# Patient Record
Sex: MALE | Race: WHITE | NOT HISPANIC OR LATINO | Employment: FULL TIME | URBAN - METROPOLITAN AREA
[De-identification: names, ages, dates, MRNs, and addresses within clinical notes are randomized per-mention and may not be internally consistent; named-entity substitution may affect disease eponyms.]

---

## 2022-01-28 ENCOUNTER — OFFICE VISIT (OUTPATIENT)
Dept: MEDICAL GROUP | Facility: LAB | Age: 37
End: 2022-01-28
Payer: COMMERCIAL

## 2022-01-28 VITALS
SYSTOLIC BLOOD PRESSURE: 110 MMHG | DIASTOLIC BLOOD PRESSURE: 68 MMHG | BODY MASS INDEX: 22.33 KG/M2 | RESPIRATION RATE: 15 BRPM | WEIGHT: 156 LBS | HEART RATE: 90 BPM | HEIGHT: 70 IN | OXYGEN SATURATION: 97 % | TEMPERATURE: 97.4 F

## 2022-01-28 DIAGNOSIS — Z01.84 IMMUNITY STATUS TESTING: ICD-10-CM

## 2022-01-28 DIAGNOSIS — L30.1 DYSHIDROTIC ECZEMA: ICD-10-CM

## 2022-01-28 DIAGNOSIS — Z11.3 ROUTINE SCREENING FOR STI (SEXUALLY TRANSMITTED INFECTION): ICD-10-CM

## 2022-01-28 DIAGNOSIS — Z76.89 ENCOUNTER TO ESTABLISH CARE: ICD-10-CM

## 2022-01-28 DIAGNOSIS — Z13.6 SCREENING FOR CARDIOVASCULAR CONDITION: ICD-10-CM

## 2022-01-28 PROCEDURE — 99203 OFFICE O/P NEW LOW 30 MIN: CPT | Mod: CS | Performed by: FAMILY MEDICINE

## 2022-01-28 RX ORDER — TRIAMCINOLONE ACETONIDE 1 MG/G
CREAM TOPICAL
Qty: 80 G | Refills: 3 | Status: SHIPPED | OUTPATIENT
Start: 2022-01-28

## 2022-01-28 ASSESSMENT — ENCOUNTER SYMPTOMS
ABDOMINAL PAIN: 0
NAUSEA: 0
SHORTNESS OF BREATH: 0
VOMITING: 0
DEPRESSION: 0
FEVER: 0
BLURRED VISION: 0
PALPITATIONS: 0
DIARRHEA: 0
CHILLS: 0
WHEEZING: 0
NERVOUS/ANXIOUS: 0

## 2022-01-28 NOTE — PROGRESS NOTES
Az Robbins is a 36 y.o. male here for   Chief Complaint   Patient presents with   • [facility]     New to [place] from [place]   • Annual Exam   • Requesting Labs       HPI:  Az is a very pleasant 36 y.o. male.     #Establish care   -Reviewed all past medical history, family history, social history.  -Reviewed all screening/vaccinations: Due for Tdap, influenza, COVID-19 vaccine.  Declines influenza, COVID-19 vaccine at this time.  -Diet and Exercise: appropriate for age. Eating healthily, exercise daily.   -Tobacco, alcohol, recreational drug use: Former smoker, discontinued smoking approximately 5 years ago.  Occasional alcohol use on the weekends.  Denies any recreational illicit drug use.  -Sexually active: Yes, currently not active; however, was sexually active with different partner this last year.  Requesting STD screening.    #Skin rash   -dry, itchy,scaly skin that is occurring on the back since moving from [facility].  He states that it is a red rash that he feels resembles ringworm.  He has been treating with topical antifungals with no improvement.  He states it is worse in areas where skin is rubbing against something such as beltline.  He has noticed his skin becoming more dry since he moved from [facility].    #COVID-19 exposure:  -Patient was exposed to COVID-19 around [place].  Around that time he also developed sore throat, cough, aches.  Patient has not vaccine for COVID-19.  No test was completed at that time.  Requesting antibody test to see if he was exposed.      Current medicines (including changes today)  Current Outpatient Medications   Medication Sig Dispense Refill   • Multiple Vitamin (MULTI VITAMIN MENS PO) Take  by mouth.       No current facility-administered medications for this visit.     He  has no past medical history on file.  He  has no past surgical history on file.  Social History     Tobacco Use   • Smoking status: Former Smoker     Start date: 2005      "Quit date:      Years since quittin.0   • Smokeless tobacco: Never Used   Vaping Use   • Vaping Use: Former   • Substances: Nicotine, Flavoring   • Devices: Pre-filled or refillable cartridge, Refillable tank   Substance Use Topics   • Alcohol use: Yes     Comment: SOCIAL    • Drug use: Never     Social History     Social History Narrative   • Not on file     No family history on file.  No family status information on file.     ROS  Review of Systems   Constitutional: Negative for chills and fever.   HENT: Negative for hearing loss.    Eyes: Negative for blurred vision.   Respiratory: Negative for shortness of breath and wheezing.    Cardiovascular: Negative for chest pain and palpitations.   Gastrointestinal: Negative for abdominal pain, diarrhea, nausea and vomiting.   Psychiatric/Behavioral: Negative for depression. The patient is not nervous/anxious.       Objective:     /68   Pulse 90   Temp 36.3 °C (97.4 °F) (Temporal)   Resp 15   Ht 1.765 m (5' 9.5\")   Wt 70.8 kg (156 lb)   SpO2 97%  Body mass index is 22.71 kg/m².  Physical Exam:    Constitutional: Alert, no distress.  Skin: Warm, dry, good turgor, no rashes in visible areas.  Eye: Equal, round and reactive, conjunctiva clear, lids normal.  ENMT: Lips without lesions, good dentition, oropharynx clear. TM's pearly gray with normal light reflexes bilaterally  Neck: Trachea midline, no masses, no thyromegaly. No cervical or supraclavicular lymphadenopathy.  Respiratory: Unlabored respiratory effort, lungs clear to auscultation bilaterally, no wheezes, rales, or ronchi.  Cardiovascular: Normal S1, S2, RRR, no murmur, no edema.  Abdomen: Soft, non-tender, no masses, no hepatosplenomegaly.  Psych: Alert and oriented x3, normal affect and mood.      Assessment and Plan:   The following treatment plan was discussed    1. Encounter to establish care  --All questions concerns were answered at this time.  -Vaccinations/screenings needed at this " time: We will complete STD screening at this time.  We will also obtain previous vaccination record from [place].  -Labs reviewed, NA, will check labs as below.  -Discussed the importance of a healthy, Mediterranean style diet, routine exercise regimen.  -Discussed general safety measures including seatbelts, helmets, avoidance of smoking, sunscreen, hydration.  -Follow-up for general physical exam on a yearly basis, sooner if needed.  - CBC WITHOUT DIFFERENTIAL; Future  - Comp Metabolic Panel; Future    2. Dyshidrotic eczema  -Signs and symptoms consistent with dyshidrotic eczema.  Discussed proper skin care including use of emollient creams daily, avoid excessive exposure to water.  We will treat acutely with triamcinolone cream.  Patient will follow-up as needed.  - triamcinolone acetonide (KENALOG) 0.1 % Cream; Apply to the affected area on back twice a day as needed for rash  Dispense: 80 g; Refill: 3    3. Immunity status testing  - SARS CoV-2 Ab, Total; Future    4. Routine screening for STI (sexually transmitted infection)  - Chlamydia/GC PCR Urine Or Swab; Future  - HIV AG/AB COMBO ASSAY SCREENING; Future  - RPR    5. Screening for cardiovascular condition  - Lipid Profile; Future      Records requested.  Followup: No follow-ups on file.         This note was created using voice recognition software. I have made every reasonable attempt to correct errors, however, I do anticipate some grammatical errors.

## 2022-11-12 ENCOUNTER — HOSPITAL ENCOUNTER (EMERGENCY)
Facility: MEDICAL CENTER | Age: 37
End: 2022-11-12
Attending: EMERGENCY MEDICINE
Payer: COMMERCIAL

## 2022-11-12 ENCOUNTER — APPOINTMENT (OUTPATIENT)
Dept: RADIOLOGY | Facility: MEDICAL CENTER | Age: 37
End: 2022-11-12
Attending: EMERGENCY MEDICINE
Payer: COMMERCIAL

## 2022-11-12 VITALS
TEMPERATURE: 97.5 F | BODY MASS INDEX: 23.51 KG/M2 | SYSTOLIC BLOOD PRESSURE: 124 MMHG | DIASTOLIC BLOOD PRESSURE: 74 MMHG | WEIGHT: 158.73 LBS | HEIGHT: 69 IN | RESPIRATION RATE: 18 BRPM | HEART RATE: 77 BPM | OXYGEN SATURATION: 94 %

## 2022-11-12 DIAGNOSIS — S42.201A CLOSED FRACTURE OF PROXIMAL END OF RIGHT HUMERUS, UNSPECIFIED FRACTURE MORPHOLOGY, INITIAL ENCOUNTER: ICD-10-CM

## 2022-11-12 PROCEDURE — 73030 X-RAY EXAM OF SHOULDER: CPT | Mod: RT

## 2022-11-12 PROCEDURE — 99283 EMERGENCY DEPT VISIT LOW MDM: CPT

## 2022-11-12 RX ORDER — IBUPROFEN 400 MG/1
400 TABLET ORAL EVERY 6 HOURS PRN
Qty: 30 TABLET | Refills: 0 | Status: SHIPPED | OUTPATIENT
Start: 2022-11-12

## 2022-11-12 RX ORDER — OXYCODONE HYDROCHLORIDE 5 MG/1
5 TABLET ORAL EVERY 4 HOURS PRN
Qty: 12 TABLET | Refills: 0 | Status: SHIPPED | OUTPATIENT
Start: 2022-11-12 | End: 2022-11-20

## 2022-11-12 NOTE — ED TRIAGE NOTES
"Presents complaining of right shoulder pain after incurring a skiing fall earlier this AM.  He denies neck pain, and declines the application of a C collar.   Chief Complaint   Patient presents with    Fall    Shoulder Injury     /86   Pulse 78   Temp 36.1 °C (97 °F) (Temporal)   Resp 18   Ht 1.753 m (5' 9\")   Wt 72 kg (158 lb 11.7 oz)   SpO2 97%   BMI 23.44 kg/m²   Has this patient been vaccinated for COVID NO  If not, would they like to be vaccinated while in the ER if eligible?  NO  Would the patient like to speak with the ERP about the possibility of receiving the COVID vaccine today before making a decision? NO    "

## 2022-11-12 NOTE — ED PROVIDER NOTES
ED Provider Note    CHIEF COMPLAINT  Chief Complaint   Patient presents with    Fall    Shoulder Injury       HPI  Az Robbins is a 36 y.o. male who presents with shoulder pain after ski accident.  Patient reports he fell onto his right shoulder while skiing.  He reports pain in his right shoulder.  He denies any associated neck or back pain.  Denies any elbow or wrist pain.  Denies any other injury sustained.    REVIEW OF SYSTEMS  ROS  See HPI for further details. All other systems are negative.     PAST MEDICAL HISTORY       SOCIAL HISTORY  Social History     Tobacco Use    Smoking status: Former     Types: Cigarettes     Start date:      Quit date:      Years since quittin.8    Smokeless tobacco: Never   Vaping Use    Vaping Use: Former    Substances: Nicotine, Flavoring    Devices: Pre-filled or refillable cartridge, Refillable tank   Substance and Sexual Activity    Alcohol use: Yes     Comment: Occasionally    Drug use: Never    Sexual activity: Not Currently       SURGICAL HISTORY  patient denies any surgical history    CURRENT MEDICATIONS  Home Medications    **Home medications have not yet been reviewed for this encounter**         ALLERGIES  No Known Allergies    PHYSICAL EXAM  Vitals:    22 1253   BP: 128/86   Pulse: 78   Resp: 18   Temp: 36.1 °C (97 °F)   SpO2: 97%       Physical Exam  Constitutional:       Appearance: He is well-developed.   Eyes:      Conjunctiva/sclera: Conjunctivae normal.   Pulmonary:      Effort: Pulmonary effort is normal.   Musculoskeletal:      Cervical back: Normal range of motion and neck supple.      Comments: Test palpation at the right glenohumeral joint without any obvious step-off.  No associated bony abnormality.  Elbow and wrist are clear of any tenderness palpation or pain on range of motion.  No tenderness of the clavicle.   Skin:     General: Skin is warm.   Neurological:      Mental Status: He is alert and oriented to person, place, and  time.   Psychiatric:         Behavior: Behavior normal.     DX-SHOULDER 2+ RIGHT   Final Result      1.  There is a mildly comminuted right greater tuberosity fracture.            COURSE & MEDICAL DECISION MAKING  Pertinent Labs & Imaging studies reviewed. (See chart for details)    X-ray is check for possible fracture versus shoulder separation.  X-ray reveals mildly comminuted greater tuberosity fracture.  Patient placed in sling.  Patient given ibuprofen and a very short course of oxycodone.  He will follow-up with orthopedics.   I discussed risks of narcotics with patient including addiction, dependence and overdose. He has been consented for use. He does not show any evidence of [place]. He understands to take this only for breakthrough pain.      The patient will return for worsening symptoms and is stable at the time of discharge. The patient verbalizes understanding and will comply.    FINAL IMPRESSION    1. Closed fracture of proximal end of right humerus, unspecified fracture morphology, initial encounter            Electronically signed by: Maxx Garcia M.D., 11/12/2022 1:04 PM

## 2023-03-16 ENCOUNTER — OFFICE VISIT (OUTPATIENT)
Dept: MEDICAL GROUP | Facility: LAB | Age: 38
End: 2023-03-16
Payer: COMMERCIAL

## 2023-03-16 VITALS
DIASTOLIC BLOOD PRESSURE: 70 MMHG | HEIGHT: 69 IN | TEMPERATURE: 97.4 F | BODY MASS INDEX: 24.44 KG/M2 | RESPIRATION RATE: 15 BRPM | WEIGHT: 165 LBS | SYSTOLIC BLOOD PRESSURE: 112 MMHG | HEART RATE: 95 BPM | OXYGEN SATURATION: 98 %

## 2023-03-16 DIAGNOSIS — Z13.6 SCREENING FOR CARDIOVASCULAR CONDITION: ICD-10-CM

## 2023-03-16 DIAGNOSIS — S49.91XA INJURY OF RIGHT SHOULDER, INITIAL ENCOUNTER: ICD-10-CM

## 2023-03-16 DIAGNOSIS — Z00.00 ANNUAL PHYSICAL EXAM: ICD-10-CM

## 2023-03-16 DIAGNOSIS — H69.91 DYSFUNCTION OF RIGHT EUSTACHIAN TUBE: ICD-10-CM

## 2023-03-16 PROCEDURE — 99395 PREV VISIT EST AGE 18-39: CPT | Performed by: FAMILY MEDICINE

## 2023-03-16 ASSESSMENT — ENCOUNTER SYMPTOMS
WHEEZING: 0
SHORTNESS OF BREATH: 0
NAUSEA: 0
ABDOMINAL PAIN: 0
BLURRED VISION: 0
VOMITING: 0
DIZZINESS: 0
PALPITATIONS: 0
DEPRESSION: 0
NERVOUS/ANXIOUS: 0
FEVER: 0
HEADACHES: 0
DIARRHEA: 0

## 2023-03-16 ASSESSMENT — PATIENT HEALTH QUESTIONNAIRE - PHQ9: CLINICAL INTERPRETATION OF PHQ2 SCORE: 0

## 2023-03-16 NOTE — PROGRESS NOTES
Subjective:   Az Robbins is a 37 y.o. male here today for   Chief Complaint   Patient presents with    Annual Exam    Hearing Problem     Saw ENT, Inflammation     Shoulder Injury     Follow-up, inform you of a ski injury      #Annual   -Reviewed all past medical history, family history, social history.  -Reviewed all screening/vaccinations: Due for Tdap, COVID vaccines.  Declines COVID-vaccine.  Due for labs include lipid profile.  -Diet and Exercise: For age and condition, no concerns  -Tobacco, alcohol, recreational drug use: Rare alcohol use.  Denies any tobacco, drug use.  -Sexually active: No new sexual partners, no concerns.    #Shoulder injury   -Injury of right shoulder occurred during ski accident November 2022.  At that time he also had closed fracture of the humerus which is being treated at [facility]. Feeling well, continuing with physical therapy which has been helping improve strength and mobility.     #Decreased hearing   -Being followed by ENT, diagnosed and treated for eustachion tube dysfunction.  Currently treating with Flonase.      No Known Allergies      Current medicines (including changes today)  Current Outpatient Medications   Medication Sig Dispense Refill    ibuprofen (MOTRIN) 400 MG Tab Take 1 Tablet by mouth every 6 hours as needed for Moderate Pain or Mild Pain. 30 Tablet 0    Multiple Vitamin (MULTI VITAMIN MENS PO) Take  by mouth.      triamcinolone acetonide (KENALOG) 0.1 % Cream Apply to the affected area on back twice a day as needed for rash 80 g 3     No current facility-administered medications for this visit.     He  has no past medical history on file.    ROS   Review of Systems   Constitutional:  Negative for fever.   HENT:  Positive for hearing loss.    Eyes:  Negative for blurred vision.   Respiratory:  Negative for shortness of breath and wheezing.    Cardiovascular:  Negative for chest pain and palpitations.   Gastrointestinal:  Negative for abdominal pain,  "diarrhea, nausea and vomiting.   Neurological:  Negative for dizziness and headaches.   Psychiatric/Behavioral:  Negative for depression. The patient is not nervous/anxious.       Objective:     Physical Exam:  /70   Pulse 95   Temp 36.3 °C (97.4 °F) (Temporal)   Resp 15   Ht 1.753 m (5' 9.02\")   Wt 74.8 kg (165 lb)   SpO2 98%  Body mass index is 24.35 kg/m².   Constitutional: Alert, no distress.  Skin: Warm, dry, good turgor, no rashes in visible areas.  Eye: Equal, round and reactive, conjunctiva clear, lids normal.  ENMT: TM's clear bilaterally, lips without lesions, good dentition, oropharynx clear.  Neck: Trachea midline, no masses, no thyromegaly. No cervical or supraclavicular lymphadenopathy.  Respiratory: Unlabored respiratory effort, lungs clear to auscultation, no wheezes, no rhonchi.  Cardiovascular: Normal S1, S2, no murmur, no edema.  Abdomen: Soft, non-tender, no masses, no hepatosplenomegaly.  Psych: Alert and oriented x3, normal affect and mood.    Assessment and Plan:     1. Annual physical exam  -All questions concerns were answered at this time.  -Vaccinations/screenings needed at this time: We will follow-up at a different appointment for Tdap.  -Labs reviewed, concerns, check labs as below.  -Discussed the importance of a healthy, Mediterranean style diet, routine exercise regimen.  -Discussed general safety measures including seatbelts, helmets, avoidance of smoking, sunscreen, hydration.  -Follow-up for general physical exam on a yearly basis, sooner if needed.  - CBC WITHOUT DIFFERENTIAL; Future  - Comp Metabolic Panel; Future    2. Dysfunction of right eustachian tube  -Continue with Flonase, follow-up with ENT in 2 weeks.    3. Injury of right shoulder, initial encounter  -Continue with [facility] therapy under the direction of orthopedics, follow-up with Ortho if needed.    4. Screening for cardiovascular condition  - Lipid Profile; Future    Followup: No follow-ups on file.     "     PLEASE NOTE: This dictation was created using voice recognition software. I have made every reasonable attempt to correct obvious errors, but I expect that there are errors of grammar and possibly content that I did not discover before finalizing the note.

## 2023-09-14 ENCOUNTER — TELEMEDICINE (OUTPATIENT)
Dept: MEDICAL GROUP | Facility: LAB | Age: 38
End: 2023-09-14
Payer: COMMERCIAL

## 2023-09-14 VITALS — BODY MASS INDEX: 24.44 KG/M2 | HEIGHT: 69 IN | WEIGHT: 165 LBS

## 2023-09-14 DIAGNOSIS — N52.1 ERECTILE DYSFUNCTION DUE TO DISEASES CLASSIFIED ELSEWHERE: ICD-10-CM

## 2023-09-14 PROCEDURE — 99213 OFFICE O/P EST LOW 20 MIN: CPT | Performed by: FAMILY MEDICINE

## 2023-09-14 RX ORDER — INFLUENZA A VIRUS A/BRISBANE/02/2018 IVR-190 (H1N1) ANTIGEN (FORMALDEHYDE INACTIVATED), INFLUENZA A VIRUS A/KANSAS/14/2017 X-327 (H3N2) ANTIGEN (FORMALDEHYDE INACTIVATED), INFLUENZA B VIRUS B/PHUKET/3073/2013 ANTIGEN (FORMALDEHYDE INACTIVATED), AND INFLUENZA B VIRUS B/MARYLAND/15/2016 BX-69A ANTIGEN (FORMALDEHYDE INACTIVATED) 7.5; 7.5; 7.5; 7.5 UG/.25ML; UG/.25ML; UG/.25ML; UG/.25ML
INJECTION, SUSPENSION INTRAMUSCULAR
COMMUNITY
End: 2023-09-14

## 2023-09-14 RX ORDER — TADALAFIL 10 MG/1
10 TABLET ORAL
Qty: 10 TABLET | Refills: 3 | Status: SHIPPED | OUTPATIENT
Start: 2023-09-14

## 2023-09-14 RX ORDER — CYCLOBENZAPRINE HCL 10 MG
TABLET ORAL
COMMUNITY
End: 2024-03-10

## 2023-09-14 RX ORDER — CLONAZEPAM 0.5 MG/1
1 TABLET ORAL
COMMUNITY
End: 2024-03-10

## 2023-09-15 NOTE — PROGRESS NOTES
"Virtual Visit: Established Patient   This visit was conducted via Zoom using secure and encrypted videoconferencing technology.   The patient was in their home in the state of [place].    The patient's identity was confirmed and verbal consent was obtained for this virtual visit.     Subjective:   CC:   Chief Complaint   Patient presents with    New Med Request     X toñito issue for medication        Az Robbins is a 37 y.o. male presenting for evaluation and management of:    #ED   -Patient here to discuss erectile dysfunction.  He states he is having difficulty both achieving and maintaining erection.  At first, it was thought to be due to a lot of stress at work; however, has become much more of a problem with new sexual partner.  Patient denies any previous history of EGD.  Denies any vascular pathology or history.  Denies any increased fatigue, lower extremity swelling, difficulty breathing.  He denies any dysuria, hematuria, urethral discharge.  Here to discuss potential treatment.    ROS   -See HPI    Current medicines (including changes today)  Current Outpatient Medications   Medication Sig Dispense Refill    clonazePAM (KLONOPIN) 0.5 MG Tab Take 1 Tablet by mouth 1 time a day as needed.      cyclobenzaprine (FLEXERIL) 10 mg Tab TAKE 1 TABLET BY MOUTH TWICE DAILY AS NEEDED FOR 10 DAYS      Influenza PEDS Vaccine Quad pf (FLUZONE QUADRIVALENT) 0.25 ML Suspension Prefilled Syringe injection ADM 0.5ML IM UTD      ibuprofen (MOTRIN) 400 MG Tab Take 1 Tablet by mouth every 6 hours as needed for Moderate Pain or Mild Pain. 30 Tablet 0    Multiple Vitamin (MULTI VITAMIN MENS PO) Take  by mouth.      triamcinolone acetonide (KENALOG) 0.1 % Cream Apply to the affected area on back twice a day as needed for rash 80 g 3     No current facility-administered medications for this visit.       There are no problems to display for this patient.       Objective:   Ht 1.753 m (5' 9.02\")   Wt 74.8 kg (165 lb)   BMI " 24.35 kg/m²     Physical Exam:  Constitutional: Alert, no distress, well-groomed.  Skin: No rashes in visible areas.  Eye: Round. Conjunctiva clear, lids normal. No icterus.   ENMT: Lips pink without lesions, good dentition, moist mucous membranes. Phonation normal.  Neck: No masses, no thyromegaly. Moves freely without pain.  Respiratory: Unlabored respiratory effort, no cough or audible wheeze  Psych: Alert and oriented x3, normal affect and mood.     Assessment and Plan:   The following treatment plan was discussed:     1. Erectile dysfunction due to diseases classified elsewhere  -New problem, uncertain prognosis.  Does not appear to be vascular at this time; however, we will continue to monitor and evaluate patient.  This does appear to be more psychogenic; however, patient denies any significant anxiety or depression.  We will begin treatment with Cialis.  Discussed appropriate use and potential side effects.  Patient will follow-up as needed.  - tadalafil (CIALIS) 10 MG tablet; Take 1 Tablet by mouth 1 time a day as needed for Erectile Dysfunction.  Dispense: 10 Tablet; Refill: 3      Follow-up: No follow-ups on file.

## 2023-09-15 NOTE — PROGRESS NOTES
Virtual Visit: Established Patient   This visit was conducted via Zoom using secure and encrypted videoconferencing technology.   The patient was in their home in the state [facility].    The patient's identity was confirmed and verbal consent was obtained for this virtual visit.     Subjective:   CC:   Chief Complaint   Patient presents with    New Med Request     X toñito issue for medication        Az Robbins is a 37 y.o. male presenting for evaluation and management of:    #ED   -    ROS   ***    Current medicines (including changes today)  Current Outpatient Medications   Medication Sig Dispense Refill    clonazePAM (KLONOPIN) 0.5 MG Tab Take 1 Tablet by mouth 1 time a day as needed.      cyclobenzaprine (FLEXERIL) 10 mg Tab TAKE 1 TABLET BY MOUTH TWICE DAILY AS NEEDED FOR 10 DAYS      Influenza PEDS Vaccine Quad pf (FLUZONE QUADRIVALENT) 0.25 ML Suspension Prefilled Syringe injection ADM 0.5ML IM UTD      ibuprofen (MOTRIN) 400 MG Tab Take 1 Tablet by mouth every 6 hours as needed for Moderate Pain or Mild Pain. 30 Tablet 0    Multiple Vitamin (MULTI VITAMIN MENS PO) Take  by mouth.      triamcinolone acetonide (KENALOG) 0.1 % Cream Apply to the affected area on back twice a day as needed for rash 80 g 3     No current facility-administered medications for this visit.       There are no problems to display for this patient.       Objective:   There were no vitals taken for this visit.    Physical Exam:***  Constitutional: Alert, no distress, well-groomed.  Skin: No rashes in visible areas.  Eye: Round. Conjunctiva clear, lids normal. No icterus.   ENMT: Lips pink without lesions, good dentition, moist mucous membranes. Phonation normal.  Neck: No masses, no thyromegaly. Moves freely without pain.  Respiratory: Unlabored respiratory effort, no cough or audible wheeze  Psych: Alert and oriented x3, normal affect and mood.     Assessment and Plan:   The following treatment plan was discussed:     There are  no diagnoses linked to this encounter.    Follow-up: No follow-ups on file.

## 2024-03-10 ENCOUNTER — APPOINTMENT (OUTPATIENT)
Dept: RADIOLOGY | Facility: IMAGING CENTER | Age: 39
End: 2024-03-10
Attending: FAMILY MEDICINE
Payer: COMMERCIAL

## 2024-03-10 ENCOUNTER — OFFICE VISIT (OUTPATIENT)
Dept: URGENT CARE | Facility: CLINIC | Age: 39
End: 2024-03-10
Payer: COMMERCIAL

## 2024-03-10 VITALS
HEIGHT: 70 IN | RESPIRATION RATE: 18 BRPM | DIASTOLIC BLOOD PRESSURE: 80 MMHG | WEIGHT: 172 LBS | BODY MASS INDEX: 24.62 KG/M2 | TEMPERATURE: 97.4 F | SYSTOLIC BLOOD PRESSURE: 108 MMHG | OXYGEN SATURATION: 99 % | HEART RATE: 80 BPM

## 2024-03-10 DIAGNOSIS — S99.921A FOOT INJURY, RIGHT, INITIAL ENCOUNTER: ICD-10-CM

## 2024-03-10 PROCEDURE — 3074F SYST BP LT 130 MM HG: CPT | Performed by: FAMILY MEDICINE

## 2024-03-10 PROCEDURE — 73630 X-RAY EXAM OF FOOT: CPT | Mod: TC,FY,RT | Performed by: FAMILY MEDICINE

## 2024-03-10 PROCEDURE — 3079F DIAST BP 80-89 MM HG: CPT | Performed by: FAMILY MEDICINE

## 2024-03-10 PROCEDURE — 99213 OFFICE O/P EST LOW 20 MIN: CPT | Performed by: FAMILY MEDICINE

## 2024-03-10 ASSESSMENT — ENCOUNTER SYMPTOMS
SENSORY CHANGE: 0
FEVER: 0

## 2024-03-10 NOTE — PROGRESS NOTES
"Subjective:     Az Robbins is a 38 y.o. male who presents for Toe Injury (Patient states phone was dropped on foot last night Rt foot under big toe, believes toe to be broken, painful)    HPI  Pt presents for evaluation of an acute injury  Patient dropped his phone onto his right foot causing immediate pain last night  Pain is located focally at the dorsal midfoot over the first metatarsal  No significant swelling, bruising, erythema  Did not sustain laceration or skin injury  Has some difficulty ambulating due to pain  Pain is better with rest  Patient is an avid skier and was unable to put his foot into his ski boot due to level of pain    Review of Systems   Constitutional:  Negative for fever.   Skin:  Negative for rash.   Neurological:  Negative for sensory change.       PMH:  has no past medical history on file.  MEDS:   Current Outpatient Medications:     tadalafil (CIALIS) 10 MG tablet, Take 1 Tablet by mouth 1 time a day as needed for Erectile Dysfunction., Disp: 10 Tablet, Rfl: 3    ibuprofen (MOTRIN) 400 MG Tab, Take 1 Tablet by mouth every 6 hours as needed for Moderate Pain or Mild Pain., Disp: 30 Tablet, Rfl: 0    Multiple Vitamin (MULTI VITAMIN MENS PO), Take  by mouth., Disp: , Rfl:     triamcinolone acetonide (KENALOG) 0.1 % Cream, Apply to the affected area on back twice a day as needed for rash, Disp: 80 g, Rfl: 3  ALLERGIES: No Known Allergies  SURGHX: History reviewed. No pertinent surgical history.  SOCHX:  reports that he quit smoking about 14 years ago. His smoking use included cigarettes. He started smoking about 19 years ago. He has never used smokeless tobacco. He reports current alcohol use. He reports that he does not use drugs.     Objective:   /80 (BP Location: Left arm, Patient Position: Sitting, BP Cuff Size: Large adult)   Pulse 80   Temp 36.3 °C (97.4 °F)   Resp 18   Ht 1.778 m (5' 10\")   Wt 78 kg (172 lb)   SpO2 99%   BMI 24.68 kg/m²     Physical " Exam  Constitutional:       General: He is not in acute distress.     Appearance: He is well-developed. He is not diaphoretic.   Pulmonary:      Effort: Pulmonary effort is normal.   Neurological:      Mental Status: He is alert.     Right ankle/foot:  Appearance: No bruising, erythema, or deformity appreciated  Palpation: +TTP dorsally over the proximal aspect of first metatarsal, no tenderness at the first MTP joint, no tenderness throughout toes  ROM: FROM throughout  Strength: 5/5 throughout  Special testing: Cuco's -   Neurovascular: 2+ dorsalis pedis and posterior tibial.  Sensation intact   Gait: Antalgic    Assessment/Plan:   Assessment    1. Foot injury, right, initial encounter  - DX-FOOT-COMPLETE 3+ RIGHT; Future    Patient with right foot contusion.  X-ray does not show fracture.  Because he has antalgic gait, did trial short walking boot in office which does greatly improve his pain.  Recommended use of boot for the next week and he may slowly wean out of it as tolerated.  If still having quite a bit of pain in a week, does need repeat x-ray.  But if pain is rapidly resolving, he does not require any follow-up imaging.  Follow-up in the urgent care as needed.     yes

## 2024-04-29 ENCOUNTER — OFFICE VISIT (OUTPATIENT)
Dept: URGENT CARE | Facility: CLINIC | Age: 39
End: 2024-04-29
Payer: COMMERCIAL

## 2024-04-29 ENCOUNTER — APPOINTMENT (OUTPATIENT)
Dept: RADIOLOGY | Facility: IMAGING CENTER | Age: 39
End: 2024-04-29
Attending: FAMILY MEDICINE
Payer: COMMERCIAL

## 2024-04-29 VITALS
DIASTOLIC BLOOD PRESSURE: 78 MMHG | OXYGEN SATURATION: 98 % | HEIGHT: 70 IN | WEIGHT: 173 LBS | TEMPERATURE: 98 F | SYSTOLIC BLOOD PRESSURE: 122 MMHG | HEART RATE: 88 BPM | BODY MASS INDEX: 24.77 KG/M2 | RESPIRATION RATE: 20 BRPM

## 2024-04-29 DIAGNOSIS — S99.921A INJURY OF TOE ON RIGHT FOOT, INITIAL ENCOUNTER: ICD-10-CM

## 2024-04-29 DIAGNOSIS — S92.531G: ICD-10-CM

## 2024-04-29 PROCEDURE — 73660 X-RAY EXAM OF TOE(S): CPT | Mod: TC,FY,RT | Performed by: RADIOLOGY

## 2024-04-29 ASSESSMENT — ENCOUNTER SYMPTOMS: CONSTITUTIONAL NEGATIVE: 1

## 2024-04-30 NOTE — PROGRESS NOTES
"Subjective:   Az Robbins is a 38 y.o. male who presents for Toe Pain (R PINKY toe swelling 59 minutes  )      HPI    Review of Systems   Constitutional: Negative.    Musculoskeletal:         Right 5th toe injury       Medications, Allergies, and current problem list reviewed today in Epic.     Objective:     /78   Pulse 88   Temp 36.7 °C (98 °F) (Temporal)   Resp 20   Ht 1.778 m (5' 10\")   Wt 78.5 kg (173 lb)   SpO2 98%     Physical Exam  Vitals and nursing note reviewed.   Constitutional:       Appearance: Normal appearance.   Musculoskeletal:      Comments: 5th toe painful, displaced outward   Neurological:      Mental Status: He is alert.         Assessment/Plan:     Diagnosis and associated orders:     1. Injury of toe on right foot, initial encounter  DX-TOE(S) 2+ RIGHT      2. Closed displaced fracture of distal phalanx of lesser toe of right foot with delayed healing, subsequent encounter           Comments/MDM:     Mu tape  Ice, elevation, stay off foot         Differential diagnosis, natural history, supportive care, and indications for immediate follow-up discussed.    Advised the patient to follow-up with the primary care physician for recheck, reevaluation, and consideration of further management.    Please note that this dictation was created using voice recognition software. I have made a reasonable attempt to correct obvious errors, but I expect that there are errors of grammar and possibly content that I did not discover before finalizing the note.    This note was electronically signed by Charly Parsons M.D.  "